# Patient Record
Sex: MALE | Race: WHITE | NOT HISPANIC OR LATINO | Employment: OTHER | URBAN - METROPOLITAN AREA
[De-identification: names, ages, dates, MRNs, and addresses within clinical notes are randomized per-mention and may not be internally consistent; named-entity substitution may affect disease eponyms.]

---

## 2018-06-25 ENCOUNTER — APPOINTMENT (OUTPATIENT)
Dept: RADIOLOGY | Facility: HOSPITAL | Age: 78
End: 2018-06-25
Payer: MEDICARE

## 2018-06-25 ENCOUNTER — HOSPITAL ENCOUNTER (EMERGENCY)
Facility: HOSPITAL | Age: 78
Discharge: HOME | End: 2018-06-25
Attending: EMERGENCY MEDICINE
Payer: MEDICARE

## 2018-06-25 VITALS
WEIGHT: 172.62 LBS | DIASTOLIC BLOOD PRESSURE: 75 MMHG | TEMPERATURE: 101.8 F | BODY MASS INDEX: 24.71 KG/M2 | OXYGEN SATURATION: 93 % | RESPIRATION RATE: 16 BRPM | HEIGHT: 70 IN | HEART RATE: 92 BPM | SYSTOLIC BLOOD PRESSURE: 133 MMHG

## 2018-06-25 DIAGNOSIS — R19.7 DIARRHEA: ICD-10-CM

## 2018-06-25 DIAGNOSIS — D64.9 ANEMIA: ICD-10-CM

## 2018-06-25 DIAGNOSIS — D72.829 LEUKOCYTOSIS: ICD-10-CM

## 2018-06-25 DIAGNOSIS — E86.0 DEHYDRATION: ICD-10-CM

## 2018-06-25 DIAGNOSIS — R50.9 FEVER: ICD-10-CM

## 2018-06-25 DIAGNOSIS — R73.9 HYPERGLYCEMIA: ICD-10-CM

## 2018-06-25 DIAGNOSIS — R53.1 GENERALIZED WEAKNESS: ICD-10-CM

## 2018-06-25 DIAGNOSIS — R11.10 VOMITING: Primary | ICD-10-CM

## 2018-06-25 LAB
ALBUMIN SERPL-MCNC: 4.1 G/DL (ref 3.5–5.3)
ALP SERPL-CCNC: 76 U/L (ref 45–115)
ALT SERPL-CCNC: 17 U/L (ref 0–52)
ANION GAP SERPL CALC-SCNC: 7 MMOL/L (ref 3–11)
AST SERPL-CCNC: 24 U/L (ref 0–39)
BASOPHILS # BLD AUTO: 0 10*3/UL
BASOPHILS NFR BLD AUTO: 0 % (ref 0–2)
BILIRUB SERPL-MCNC: 0.73 MG/DL (ref 0–1.4)
BUN SERPL-MCNC: 21 MG/DL (ref 7–25)
CALCIUM ALBUM COR SERPL-MCNC: 9.6 MG/DL (ref 8.5–10.1)
CALCIUM SERPL-MCNC: 9.7 MG/DL (ref 8.6–10.3)
CHLORIDE SERPL-SCNC: 105 MMOL/L (ref 98–107)
CO2 SERPL-SCNC: 25 MMOL/L (ref 21–32)
CREAT SERPL-MCNC: 1.3 MG/DL (ref 0.8–1.3)
EOSINOPHIL # BLD AUTO: 0 10*3/UL
EOSINOPHIL NFR BLD AUTO: 0 % (ref 0–3)
ERYTHROCYTE [DISTWIDTH] IN BLOOD BY AUTOMATED COUNT: 14 % (ref 11.5–15)
GFR SERPL CREATININE-BSD FRML MDRD: 53 ML/MIN/1.73M*2
GLUCOSE SERPL-MCNC: 126 MG/DL (ref 70–105)
HCT VFR BLD AUTO: 35.5 % (ref 38–50)
HGB BLD-MCNC: 11.9 G/DL (ref 13.2–17.2)
LACTATE BLDV-SCNC: 0.38 MMOL/L (ref 0.5–1.99)
LYMPHOCYTES # BLD AUTO: 0.6 10*3/UL
LYMPHOCYTES NFR BLD AUTO: 5 % (ref 15–47)
MCH RBC QN AUTO: 31.6 PG (ref 29–34)
MCHC RBC AUTO-ENTMCNC: 33.6 G/DL (ref 32–36)
MCV RBC AUTO: 94.1 FL (ref 82–97)
MONOCYTES # BLD AUTO: 1 10*3/UL
MONOCYTES NFR BLD AUTO: 8 % (ref 5–13)
NEUTROPHILS # BLD AUTO: 9.8 10*3/UL
NEUTROPHILS NFR BLD AUTO: 86 % (ref 46–70)
PLATELET # BLD AUTO: 164 10*3/UL (ref 130–350)
PMV BLD AUTO: 7.7 FL (ref 6.9–10.8)
POTASSIUM SERPL-SCNC: 3.7 MMOL/L (ref 3.5–5.1)
PROT SERPL-MCNC: 7.2 G/DL (ref 6–8.3)
RBC # BLD AUTO: 3.78 10*6/ΜL (ref 4.1–5.8)
SODIUM SERPL-SCNC: 137 MMOL/L (ref 135–145)
WBC # BLD AUTO: 11.3 10*3/UL (ref 3.7–9.6)

## 2018-06-25 PROCEDURE — 96374 THER/PROPH/DIAG INJ IV PUSH: CPT

## 2018-06-25 PROCEDURE — 83605 ASSAY OF LACTIC ACID: CPT

## 2018-06-25 PROCEDURE — 99283 EMERGENCY DEPT VISIT LOW MDM: CPT | Performed by: EMERGENCY MEDICINE

## 2018-06-25 PROCEDURE — 93005 ELECTROCARDIOGRAM TRACING: CPT

## 2018-06-25 PROCEDURE — 2580000300 HC RX 258: Performed by: EMERGENCY MEDICINE

## 2018-06-25 PROCEDURE — 80053 COMPREHEN METABOLIC PANEL: CPT | Performed by: EMERGENCY MEDICINE

## 2018-06-25 PROCEDURE — 96375 TX/PRO/DX INJ NEW DRUG ADDON: CPT

## 2018-06-25 PROCEDURE — 71046 X-RAY EXAM CHEST 2 VIEWS: CPT

## 2018-06-25 PROCEDURE — 87040 BLOOD CULTURE FOR BACTERIA: CPT | Performed by: EMERGENCY MEDICINE

## 2018-06-25 PROCEDURE — 2500000200 HC RX 250 WO HCPCS: Performed by: EMERGENCY MEDICINE

## 2018-06-25 PROCEDURE — 36415 COLL VENOUS BLD VENIPUNCTURE: CPT | Performed by: EMERGENCY MEDICINE

## 2018-06-25 PROCEDURE — 85025 COMPLETE CBC W/AUTO DIFF WBC: CPT | Performed by: EMERGENCY MEDICINE

## 2018-06-25 PROCEDURE — 6360000200 HC RX 636 W HCPCS (ALT 250 FOR IP): Performed by: EMERGENCY MEDICINE

## 2018-06-25 RX ORDER — ONDANSETRON HYDROCHLORIDE 2 MG/ML
4 INJECTION, SOLUTION INTRAVENOUS ONCE
Status: COMPLETED | OUTPATIENT
Start: 2018-06-25 | End: 2018-06-25

## 2018-06-25 RX ORDER — MORPHINE SULFATE 4 MG/ML
4 INJECTION, SOLUTION INTRAMUSCULAR; INTRAVENOUS ONCE
Status: COMPLETED | OUTPATIENT
Start: 2018-06-25 | End: 2018-06-25

## 2018-06-25 RX ORDER — NAPROXEN 500 MG/1
500 TABLET ORAL 2 TIMES DAILY WITH MEALS
Qty: 20 TABLET | Refills: 0 | Status: SHIPPED | OUTPATIENT
Start: 2018-06-25 | End: 2019-06-25

## 2018-06-25 RX ORDER — SODIUM CHLORIDE 9 MG/ML
1000 INJECTION, SOLUTION INTRAVENOUS ONCE
Status: COMPLETED | OUTPATIENT
Start: 2018-06-25 | End: 2018-06-25

## 2018-06-25 RX ORDER — ONDANSETRON 4 MG/1
4 TABLET, ORALLY DISINTEGRATING ORAL EVERY 8 HOURS PRN
Qty: 20 TABLET | Refills: 0 | Status: SHIPPED | OUTPATIENT
Start: 2018-06-25 | End: 2018-07-02

## 2018-06-25 RX ORDER — ACETAMINOPHEN 500 MG
1000 TABLET ORAL ONCE
Status: COMPLETED | OUTPATIENT
Start: 2018-06-25 | End: 2018-06-25

## 2018-06-25 RX ADMIN — ONDANSETRON 4 MG: 2 INJECTION INTRAMUSCULAR; INTRAVENOUS at 21:30

## 2018-06-25 RX ADMIN — SODIUM CHLORIDE 1000 ML: 9 INJECTION, SOLUTION INTRAVENOUS at 21:30

## 2018-06-25 RX ADMIN — Medication 1000 MG: at 21:31

## 2018-06-25 RX ADMIN — MORPHINE SULFATE 4 MG: 4 INJECTION, SOLUTION INTRAMUSCULAR; INTRAVENOUS at 21:32

## 2018-06-26 NOTE — DISCHARGE INSTRUCTIONS
Take your medicines as prescribed.  Drink plenty of fluids.  If you change your mind or your symptoms worsen

## 2018-06-26 NOTE — ED PROVIDER NOTES
Weakness - Generalized (PT REPORTS TRAVLING AND HAVING WEAKNES AND ABD PAIN WITH DIARRHEA)        HPI:    Patient is a 78 year old male presenting to the ED with complaints of diarrhea that began 24 hours ago. Patient states that he has had four episodes of watery stools since the onset of his symptoms. He reports associated abdominal pain that he describes as a diffuse cramping sensation, chills, fever, and generalized weakness. He is accompanied by his wife and she states that the patient has had associated vomiting. He has reportedly vomited twice since eating dinner tonight. He has had some rhinorrhea but denies congestion or cough. He denies hematemesis, bloody stools, or urinary symptoms and he has no other physical complaints at this time.    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 He denies a history of cardiac issues.       Past Medical History:   Diagnosis Date   • DVT (deep venous thrombosis) (CMS/[facility])        Past Surgical History:   Procedure Laterality Date   • JOINT REPLACEMENT      RT KNEE AND RT HIP       Social History     Social History   • Marital status:      Spouse name: N/A   • Number of children: N/A   • Years of education: N/A     Occupational History   • Not on file.     Social History Main Topics   • Smoking status: Never Smoker   • Smokeless tobacco: Never Used   • Alcohol use Yes      Comment: OCCASIONALLY   • Drug use: No   • Sexual activity: Not on file     Other Topics Concern   • Not on file     Social History Narrative   • No narrative on file       History reviewed. No pertinent family history.    Allergies   Allergen Reactions   • Oxycodone Anaphylaxis   • Ciprofloxacin Rash         Current Outpatient Prescriptions:   •  naproxen (NAPROSYN) 500 mg tablet, Take 1 tablet (500 mg total) by mouth 2 (two) times a day with meals., Disp: 20 tablet, Rfl: 0  •  ondansetron ODT (ZOFRAN ODT) 4 mg disintegrating  tablet, Take 1 tablet (4 mg total) by mouth every 8 (eight) hours as needed for nausea or vomiting for up to 7 days., Disp: 20 tablet, Rfl: 0      ROS:  Constitutional: positive for fever, positive for chills, positive for generalized weakness  Eyes: negative for visual changes  ENT: negative for sore throat  Cardiovascular: negative for chest pain  Respiratory: negative for shortness of breath   GI: positive for abdominal pain, positive for vomiting, positive for diarrhea  : negative for hematuria  Musculoskeletal: negative for back pain  Neuro: negative for headache  Hematology: negative for bleeding  Immunology: negative for joint pain      ED Triage Vitals [06/25/18 1903]   Temp Heart Rate Resp BP SpO2   37.7 °C (99.9 °F) 92 18 142/77 94 %      Temp Source Heart Rate Source Patient Position BP Location FiO2 (%)   Oral -- -- -- --         Physical Exam:  Nursing note and vitals reviewed.  Constitutional: Patient is a weak and tired but non-toxic appearing elderly male.   Head: Normocephalic and atraumatic. Mucous membranes are dry.   Eyes: Pupils are equal, round, and reactive to light.   Neck: Supple.  Cardiovascular: Regular rate and rhythm without murmur.   Pulmonary/Chest: No respiratory distress.  Clear to auscultation bilaterally.  Abdominal: Soft and nontender.   Back: No CVA tenderness. No midline tenderness.   Musculoskeletal: No edema in lower extremities.   Neurological: Alert.   Skin: Skin is warm and dry. No rash noted.   Psychiatric: Normal mood and affect.        Labs:  Labs Reviewed   CBC WITH AUTO DIFFERENTIAL - Abnormal        Result Value    WBC 11.3 (*)     RBC 3.78 (*)     Hemoglobin 11.9 (*)     Hematocrit 35.5 (*)     MCV 94.1      MCH 31.6      MCHC 33.6      RDW 14.0      Platelets 164      MPV 7.7      Neutrophils% 86 (*)     Lymphocytes% 5 (*)     Monocytes% 8      Eosinophils% 0      Basophils% 0      Neutrophils Absolute 9.80      Lymphocytes Absolute 0.60      Monocytes Absolute  1.00      Eosinophils Absolute 0.00      Basophils Absolute 0.00     COMPREHENSIVE METABOLIC PANEL - Abnormal     Sodium 137      Potassium 3.7      Chloride 105      CO2 25      Anion Gap 7      BUN 21      Creatinine 1.3      Glucose 126 (*)     Calcium 9.7      AST 24      ALT (SGPT) 17      Alkaline Phosphatase 76      Total Protein 7.2      Albumin 4.1      Total Bilirubin 0.73      eGFR 53 (*)     Corrected Calcium 9.6      Narrative:     ESTIMATED GFR CALCULATED USING THE IDMS-TRACEABLE MDRD STUDY EQUATION WITH THE RESULT NORMALIZED TO 1.73M^2 BODY SURFACE AREA.    AVERAGE GFR BY AGE RANGE     20-30 years 116 mL/min/1.73m^2  30-40 years 107 mL/min/1.73m^2  40-50 years 99 mL/min/1.73m^2  50-60 years 93 mL/min/1.73m^2  60-70 years 85 mL/min/1.73m^2  70-up years 75 mL/min/1.73m^2   POCT LACTIC ACID (LACTATE) VENOUS RESULTS ONLY - Abnormal     POC Lactate 0.38 (*)    BLOOD CULTURES, 2 SETS    Narrative:     The following orders were created for panel order Blood Cultures, 2 Sets.  Procedure                               Abnormality         Status                     ---------                               -----------         ------                     Blood culture, 1 set[[id number]]          Normal              Preliminary result         Blood culture, 1 set[[id number]]          Normal              Preliminary result           Please view results for these tests on the individual orders.       Imaging:  X-ray chest 2 views   Final Result   IMPRESSION:   Probable old mild vertebral compression fractures. No definite acute abnormality.                   MDM:    This is a 78 year old male with generalized weakness, vomiting, and diarrhea. He will be given 1 liters of normal saline along with Morphine and Zofran. He will also be given Motrin and baseline labs will be checked. He will be observed.     He was noted to have a rectal temperature over 38. Blood cultures x2 were obtained and lactate will be ordered.     He  looks better after treatment. Hospitalization was recommended, however, patient and his wife adamantly refused. A long conversation was had regarding the increased likelihood of a poor outcome if they leave the hospital. They are aware of this and verbalize understanding of this. They will be discharged home with a prescription for Zofran and Naprosyn. Reasons to return to the ED or any local hospital were discussed at length. Patient verbalizes understanding of this and will now be discharged.    Given   Medications   acetaminophen (TYLENOL) tablet 1,000 mg (1,000 mg oral Given 6/25/18 2131)   NS bolus 1,000 mL (0 mL intravenous Stopped 6/25/18 2212)   morphine injection 4 mg (4 mg intravenous Given 6/25/18 2132)   ondansetron (ZOFRAN) injection 4 mg (4 mg intravenous Given 6/25/18 2130)         Procedure    ECG 12 lead  Date/Time: 6/25/2018 8:59 PM  Performed by: ALBANIA OSBORNE  Authorized by: ALBANIA OSBORNE     Comments:      Sinus rhythm rate 91, normal axis, normal intervals, good R-wave progression, no acute ischemic changes, no ectopy.                     Clinical Impression:    Final diagnoses:   [R11.10] Vomiting   [R19.7] Diarrhea   [R53.1] Generalized weakness   [E86.0] Dehydration   [R50.9] Fever   [D72.829] Leukocytosis   [R73.9] Hyperglycemia   [D64.9] Anemia       By signing my name, I, Maryuri Cao, attest that this documentation has been prepared under the direction and in the presence of Dr. Osborne, 6/25/2018, 8:59 PM.      I, Albania Osborne MD, personally performed the services described in this documentation as transcribed by an emergency department scribe, in my presence, and it is both accurate and complete.      Albania Osborne MD  06/27/18 0127

## 2018-07-01 LAB
BACTERIA BLD CULT: NORMAL
BACTERIA BLD CULT: NORMAL

## 2018-12-20 ENCOUNTER — APPOINTMENT (RX ONLY)
Dept: URBAN - METROPOLITAN AREA CLINIC 128 | Facility: CLINIC | Age: 78
Setting detail: DERMATOLOGY
End: 2018-12-20

## 2018-12-20 DIAGNOSIS — L81.4 OTHER MELANIN HYPERPIGMENTATION: ICD-10-CM

## 2018-12-20 DIAGNOSIS — L82.1 OTHER SEBORRHEIC KERATOSIS: ICD-10-CM

## 2018-12-20 DIAGNOSIS — L91.8 OTHER HYPERTROPHIC DISORDERS OF THE SKIN: ICD-10-CM

## 2018-12-20 DIAGNOSIS — S1092XA BLISTER OF FACE, NECK, AND SCALP EXCEPT EYE, WITHOUT MENTION OF INFECTION: ICD-10-CM

## 2018-12-20 DIAGNOSIS — S1012XA BLISTER OF FACE, NECK, AND SCALP EXCEPT EYE, WITHOUT MENTION OF INFECTION: ICD-10-CM

## 2018-12-20 DIAGNOSIS — D18.0 HEMANGIOMA: ICD-10-CM

## 2018-12-20 DIAGNOSIS — S00521A BLISTER OF FACE, NECK, AND SCALP EXCEPT EYE, WITHOUT MENTION OF INFECTION: ICD-10-CM

## 2018-12-20 DIAGNOSIS — L90.5 SCAR CONDITIONS AND FIBROSIS OF SKIN: ICD-10-CM

## 2018-12-20 DIAGNOSIS — Z71.89 OTHER SPECIFIED COUNSELING: ICD-10-CM

## 2018-12-20 DIAGNOSIS — S00522A BLISTER OF FACE, NECK, AND SCALP EXCEPT EYE, WITHOUT MENTION OF INFECTION: ICD-10-CM

## 2018-12-20 DIAGNOSIS — S0002XA BLISTER OF FACE, NECK, AND SCALP EXCEPT EYE, WITHOUT MENTION OF INFECTION: ICD-10-CM

## 2018-12-20 DIAGNOSIS — L57.0 ACTINIC KERATOSIS: ICD-10-CM

## 2018-12-20 DIAGNOSIS — B35.1 TINEA UNGUIUM: ICD-10-CM

## 2018-12-20 DIAGNOSIS — L81.5 LEUKODERMA, NOT ELSEWHERE CLASSIFIED: ICD-10-CM

## 2018-12-20 DIAGNOSIS — S00429A BLISTER OF FACE, NECK, AND SCALP EXCEPT EYE, WITHOUT MENTION OF INFECTION: ICD-10-CM

## 2018-12-20 DIAGNOSIS — D22 MELANOCYTIC NEVI: ICD-10-CM

## 2018-12-20 DIAGNOSIS — S0032XA BLISTER OF FACE, NECK, AND SCALP EXCEPT EYE, WITHOUT MENTION OF INFECTION: ICD-10-CM

## 2018-12-20 DIAGNOSIS — I83.9 ASYMPTOMATIC VARICOSE VEINS OF LOWER EXTREMITIES: ICD-10-CM

## 2018-12-20 DIAGNOSIS — S0092XA BLISTER OF FACE, NECK, AND SCALP EXCEPT EYE, WITHOUT MENTION OF INFECTION: ICD-10-CM

## 2018-12-20 PROBLEM — D22.5 MELANOCYTIC NEVI OF TRUNK: Status: ACTIVE | Noted: 2018-12-20

## 2018-12-20 PROBLEM — D18.01 HEMANGIOMA OF SKIN AND SUBCUTANEOUS TISSUE: Status: ACTIVE | Noted: 2018-12-20

## 2018-12-20 PROBLEM — I83.93 ASYMPTOMATIC VARICOSE VEINS OF BILATERAL LOWER EXTREMITIES: Status: ACTIVE | Noted: 2018-12-20

## 2018-12-20 PROBLEM — S60.422A BLISTER (NONTHERMAL) OF RIGHT MIDDLE FINGER, INITIAL ENCOUNTER: Status: ACTIVE | Noted: 2018-12-20

## 2018-12-20 PROCEDURE — 99203 OFFICE O/P NEW LOW 30 MIN: CPT | Mod: 25

## 2018-12-20 PROCEDURE — ? OTHER

## 2018-12-20 PROCEDURE — ? COUNSELING

## 2018-12-20 PROCEDURE — 17003 DESTRUCT PREMALG LES 2-14: CPT

## 2018-12-20 PROCEDURE — 17000 DESTRUCT PREMALG LESION: CPT

## 2018-12-20 PROCEDURE — ? LIQUID NITROGEN

## 2018-12-20 PROCEDURE — ? DIAGNOSIS COMMENT

## 2018-12-20 ASSESSMENT — LOCATION SIMPLE DESCRIPTION DERM
LOCATION SIMPLE: RIGHT CHEEK
LOCATION SIMPLE: LEFT THIGH
LOCATION SIMPLE: RIGHT LOWER BACK
LOCATION SIMPLE: RIGHT THIGH
LOCATION SIMPLE: LEFT UPPER ARM
LOCATION SIMPLE: UPPER BACK
LOCATION SIMPLE: RIGHT KNEE
LOCATION SIMPLE: ABDOMEN
LOCATION SIMPLE: LEFT GREAT TOE
LOCATION SIMPLE: RIGHT UPPER ARM
LOCATION SIMPLE: LEFT CHEEK
LOCATION SIMPLE: RIGHT UPPER BACK
LOCATION SIMPLE: LEFT KNEE
LOCATION SIMPLE: RIGHT AXILLARY VAULT
LOCATION SIMPLE: RIGHT MIDDLE FINGER
LOCATION SIMPLE: RIGHT GREAT TOE
LOCATION SIMPLE: LEFT AXILLARY VAULT

## 2018-12-20 ASSESSMENT — LOCATION DETAILED DESCRIPTION DERM
LOCATION DETAILED: LEFT ANTERIOR PROXIMAL THIGH
LOCATION DETAILED: RIGHT SUPERIOR CENTRAL MALAR CHEEK
LOCATION DETAILED: LEFT DORSAL GREAT TOE
LOCATION DETAILED: LEFT MEDIAL MANDIBULAR CHEEK
LOCATION DETAILED: PERIUMBILICAL SKIN
LOCATION DETAILED: RIGHT SUPERIOR MEDIAL MIDBACK
LOCATION DETAILED: LEFT KNEE
LOCATION DETAILED: RIGHT KNEE
LOCATION DETAILED: LEFT AXILLARY VAULT
LOCATION DETAILED: RIGHT DISTAL POSTERIOR UPPER ARM
LOCATION DETAILED: RIGHT MIDDLE FINGERTIP
LOCATION DETAILED: RIGHT MID-UPPER BACK
LOCATION DETAILED: LEFT DISTAL POSTERIOR UPPER ARM
LOCATION DETAILED: SUPERIOR THORACIC SPINE
LOCATION DETAILED: RIGHT AXILLARY VAULT
LOCATION DETAILED: RIGHT SUPERIOR UPPER BACK
LOCATION DETAILED: EPIGASTRIC SKIN
LOCATION DETAILED: RIGHT ANTERIOR DISTAL THIGH
LOCATION DETAILED: LEFT MEDIAL MALAR CHEEK
LOCATION DETAILED: RIGHT DISTAL PLANTAR GREAT TOE

## 2018-12-20 ASSESSMENT — LOCATION ZONE DERM
LOCATION ZONE: FACE
LOCATION ZONE: ARM
LOCATION ZONE: TOE
LOCATION ZONE: FINGER
LOCATION ZONE: TRUNK
LOCATION ZONE: LEG
LOCATION ZONE: AXILLAE

## 2018-12-20 NOTE — PROCEDURE: OTHER
Note Text (......Xxx Chief Complaint.): This diagnosis correlates with the
Other (Free Text): LN2 handout was given
Detail Level: Zone

## 2018-12-20 NOTE — PROCEDURE: LIQUID NITROGEN
Duration Of Freeze Thaw-Cycle (Seconds): 3
Consent: The patient's consent was obtained including but not limited to risks of crusting, scabbing, blistering, scarring, darker or lighter pigmentary change, recurrence, incomplete removal and infection.
Render Post-Care Instructions In Note?: no
Post-Care Instructions: I reviewed with the patient in detail post-care instructions. Patient is to wear sunprotection, and avoid picking at any of the treated lesions. Pt may apply Vaseline to crusted or scabbing areas.
Detail Level: Simple
Number Of Freeze-Thaw Cycles: 1 freeze-thaw cycle

## 2022-06-04 ENCOUNTER — TELEPHONE ENCOUNTER (OUTPATIENT)
Dept: URBAN - METROPOLITAN AREA CLINIC 68 | Facility: CLINIC | Age: 82
End: 2022-06-04

## 2022-06-05 ENCOUNTER — TELEPHONE ENCOUNTER (OUTPATIENT)
Dept: URBAN - METROPOLITAN AREA CLINIC 68 | Facility: CLINIC | Age: 82
End: 2022-06-05

## 2022-06-05 RX ORDER — PANTOPRAZOLE SODIUM 40 MG/1
TABLET, DELAYED RELEASE ORAL EVERY 12 HOURS
Qty: 60 | Refills: 0 | Status: ACTIVE | COMMUNITY
Start: 2017-07-31

## 2022-06-05 RX ORDER — SUCRALFATE 1 G/1
SUCRALFATE( 1GM ORAL 4 DAILY ) ACTIVE -HX ENTRY TABLET ORAL DAILY
Status: ACTIVE | COMMUNITY
Start: 2017-07-31

## 2022-06-25 ENCOUNTER — TELEPHONE ENCOUNTER (OUTPATIENT)
Age: 82
End: 2022-06-25

## 2022-06-26 ENCOUNTER — TELEPHONE ENCOUNTER (OUTPATIENT)
Age: 82
End: 2022-06-26

## 2022-06-26 RX ORDER — ACETAMINOPHEN ER 650 MG TABLET,EXTENDED RELEASE 650 MG
TYLENOL( 500MG ORAL 3-4 DAILY ) ACTIVE -HX ENTRY TABLET, EXTENDED RELEASE ORAL DAILY
Status: ACTIVE | COMMUNITY
Start: 2017-07-31

## 2022-06-26 RX ORDER — WARFARIN 2.5 MG/1
COUMADIN( 2.5MG ORAL 1 DAILY ) ACTIVE -HX ENTRY TABLET ORAL DAILY
Status: ACTIVE | COMMUNITY
Start: 2017-07-31

## 2022-06-26 RX ORDER — PANTOPRAZOLE 40 MG/1
TABLET, DELAYED RELEASE ORAL EVERY 12 HOURS
Qty: 60 | Refills: 0 | Status: ACTIVE | COMMUNITY
Start: 2017-07-31

## 2022-06-26 RX ORDER — SUCRALFATE 1 G/1
SUCRALFATE( 1GM ORAL 4 DAILY ) ACTIVE -HX ENTRY TABLET ORAL DAILY
Status: ACTIVE | COMMUNITY
Start: 2017-07-31